# Patient Record
Sex: MALE | Race: WHITE | NOT HISPANIC OR LATINO | URBAN - METROPOLITAN AREA
[De-identification: names, ages, dates, MRNs, and addresses within clinical notes are randomized per-mention and may not be internally consistent; named-entity substitution may affect disease eponyms.]

---

## 2024-01-03 ENCOUNTER — OFFICE VISIT (OUTPATIENT)
Dept: UROLOGY | Facility: CLINIC | Age: 19
End: 2024-01-03
Payer: COMMERCIAL

## 2024-01-03 VITALS
HEIGHT: 73 IN | HEART RATE: 77 BPM | OXYGEN SATURATION: 99 % | DIASTOLIC BLOOD PRESSURE: 88 MMHG | SYSTOLIC BLOOD PRESSURE: 138 MMHG

## 2024-01-03 DIAGNOSIS — N50.811 RIGHT TESTICULAR PAIN: Primary | ICD-10-CM

## 2024-01-03 PROCEDURE — 3075F SYST BP GE 130 - 139MM HG: CPT | Mod: CPTII,S$GLB,, | Performed by: NURSE PRACTITIONER

## 2024-01-03 PROCEDURE — 1159F MED LIST DOCD IN RCRD: CPT | Mod: CPTII,S$GLB,, | Performed by: NURSE PRACTITIONER

## 2024-01-03 PROCEDURE — 3079F DIAST BP 80-89 MM HG: CPT | Mod: CPTII,S$GLB,, | Performed by: NURSE PRACTITIONER

## 2024-01-03 PROCEDURE — 1160F RVW MEDS BY RX/DR IN RCRD: CPT | Mod: CPTII,S$GLB,, | Performed by: NURSE PRACTITIONER

## 2024-01-03 PROCEDURE — 99203 OFFICE O/P NEW LOW 30 MIN: CPT | Mod: S$GLB,,, | Performed by: NURSE PRACTITIONER

## 2024-01-03 NOTE — PROGRESS NOTES
Subjective:      Dg Le is a 18 y.o. male who was self-referred for evaluation of his testicular exam.    The patient presents today reporting fullness around his R testicle that he first noticed about 2 weeks ago. He is concerned for varicocele. Reports feeling a bump near the R testicle. Denies trauma to the groin. The discomfort is alleviated by movement. He discomfort is variable. He is unable to identify aggravating factors.     Denies bothersome urinary symptoms. Denies penile discharge and potential STD exposure.     The following portions of the patient's history were reviewed and updated as appropriate: allergies, current medications, past family history, past medical history, past social history, past surgical history and problem list.    Review of Systems  Constitutional: no fever or chills  ENT: no nasal congestion or sore throat  Respiratory: no cough or shortness of breath  Cardiovascular: no chest pain or palpitations  Gastrointestinal: no nausea or vomiting, tolerating diet  Genitourinary: as per HPI  Hematologic/Lymphatic: no easy bruising or lymphadenopathy  Musculoskeletal: no arthralgias or myalgias  Neurological: no seizures or tremors  Behavioral/Psych: no auditory or visual hallucinations     Objective:   Vitals:   Vitals:    01/03/24 0824   BP: 138/88   Pulse: 77     Physical Exam   General: alert and oriented, no acute distress  Head: normocephalic, atraumatic  Neck: supple, normal ROM  Respiratory: Symmetric expansion, non-labored breathing  Cardiovascular: regular rate and rhythm  Abdomen: soft, non tender, non distended  Genitourinary:   Penis: normal, no lesions, patent orthotopic meatus, no plaques  Scrotum: no rashes or skin changes; tight scrotum- difficult to perform full exam   Testes: descended bilaterally, no masses, nontender, normal epididymides bilaterally, no hydroceles  Skin: normal coloration and turgor, no rashes, no suspicious skin lesions noted  Neuro: alert  "and oriented x3, no gross deficits  Psych: normal judgment and insight, normal mood/affect, and non-anxious    Lab Review   Urinalysis demonstrates negative for all components  No results found for: "WBC", "HGB", "HCT", "MCV", "PLT"  No results found for: "CREATININE", "BUN"  No results found for: "PSA"  Imaging   None    Assessment:     1. Right testicular pain      Plan:   Dg was seen today for possible varicole.  and testicle pain.    Diagnoses and all orders for this visit:    Right testicular pain  -     US Scrotum And Testicles; Future    Plan:  --Discussed conservative measures for relieving testicular pain - scrotal support, ibuprofen, scrotal elevation, ice packs  --Scrotal US for further eval  --Will notify with results       "

## 2024-01-05 ENCOUNTER — HOSPITAL ENCOUNTER (OUTPATIENT)
Dept: RADIOLOGY | Facility: OTHER | Age: 19
Discharge: HOME | End: 2024-01-05
Attending: NURSE PRACTITIONER
Payer: COMMERCIAL

## 2024-01-05 DIAGNOSIS — N50.811 RIGHT TESTICULAR PAIN: ICD-10-CM

## 2024-01-05 PROCEDURE — 76870 US EXAM SCROTUM: CPT | Mod: 26,,, | Performed by: RADIOLOGY

## 2024-01-05 PROCEDURE — 76870 US EXAM SCROTUM: CPT | Mod: TC

## 2024-05-30 ENCOUNTER — LAB VISIT (OUTPATIENT)
Dept: LAB | Facility: HOSPITAL | Age: 19
End: 2024-05-30
Attending: INTERNAL MEDICINE
Payer: COMMERCIAL

## 2024-05-30 ENCOUNTER — OFFICE VISIT (OUTPATIENT)
Dept: PRIMARY CARE CLINIC | Facility: CLINIC | Age: 19
End: 2024-05-30
Payer: COMMERCIAL

## 2024-05-30 VITALS
DIASTOLIC BLOOD PRESSURE: 78 MMHG | HEIGHT: 73 IN | HEART RATE: 100 BPM | OXYGEN SATURATION: 99 % | SYSTOLIC BLOOD PRESSURE: 142 MMHG | BODY MASS INDEX: 24.25 KG/M2 | WEIGHT: 183 LBS

## 2024-05-30 DIAGNOSIS — N50.819 PAIN IN TESTICLE, UNSPECIFIED LATERALITY: Primary | ICD-10-CM

## 2024-05-30 DIAGNOSIS — M54.9 DORSALGIA, UNSPECIFIED: ICD-10-CM

## 2024-05-30 DIAGNOSIS — G89.29 CHRONIC LEFT SI JOINT PAIN: ICD-10-CM

## 2024-05-30 DIAGNOSIS — R10.9 ABDOMINAL PAIN, UNSPECIFIED ABDOMINAL LOCATION: ICD-10-CM

## 2024-05-30 DIAGNOSIS — M53.3 CHRONIC LEFT SI JOINT PAIN: ICD-10-CM

## 2024-05-30 DIAGNOSIS — N50.819 PAIN IN TESTICLE, UNSPECIFIED LATERALITY: ICD-10-CM

## 2024-05-30 LAB
BILIRUB UR QL STRIP: NEGATIVE
CLARITY UR REFRACT.AUTO: CLEAR
COLOR UR AUTO: YELLOW
GLUCOSE UR QL STRIP: NEGATIVE
HGB UR QL STRIP: NEGATIVE
KETONES UR QL STRIP: NEGATIVE
LEUKOCYTE ESTERASE UR QL STRIP: NEGATIVE
MICROSCOPIC COMMENT: NORMAL
NITRITE UR QL STRIP: NEGATIVE
PH UR STRIP: 6 [PH] (ref 5–8)
PROT UR QL STRIP: NEGATIVE
SP GR UR STRIP: 1.01 (ref 1–1.03)
URN SPEC COLLECT METH UR: NORMAL

## 2024-05-30 PROCEDURE — 1159F MED LIST DOCD IN RCRD: CPT | Mod: CPTII,S$GLB,, | Performed by: INTERNAL MEDICINE

## 2024-05-30 PROCEDURE — 81001 URINALYSIS AUTO W/SCOPE: CPT | Performed by: INTERNAL MEDICINE

## 2024-05-30 PROCEDURE — 3077F SYST BP >= 140 MM HG: CPT | Mod: CPTII,S$GLB,, | Performed by: INTERNAL MEDICINE

## 2024-05-30 PROCEDURE — 87086 URINE CULTURE/COLONY COUNT: CPT | Performed by: INTERNAL MEDICINE

## 2024-05-30 PROCEDURE — 99204 OFFICE O/P NEW MOD 45 MIN: CPT | Mod: S$GLB,,, | Performed by: INTERNAL MEDICINE

## 2024-05-30 PROCEDURE — 99999 PR PBB SHADOW E&M-EST. PATIENT-LVL IV: CPT | Mod: PBBFAC,,, | Performed by: INTERNAL MEDICINE

## 2024-05-30 PROCEDURE — 3078F DIAST BP <80 MM HG: CPT | Mod: CPTII,S$GLB,, | Performed by: INTERNAL MEDICINE

## 2024-05-30 PROCEDURE — 3008F BODY MASS INDEX DOCD: CPT | Mod: CPTII,S$GLB,, | Performed by: INTERNAL MEDICINE

## 2024-05-30 RX ORDER — MELOXICAM 7.5 MG/1
7.5 TABLET ORAL DAILY
Qty: 7 TABLET | Refills: 0 | Status: SHIPPED | OUTPATIENT
Start: 2024-05-30

## 2024-05-30 RX ORDER — CYCLOBENZAPRINE HCL 5 MG
5 TABLET ORAL NIGHTLY
Qty: 10 TABLET | Refills: 0 | Status: SHIPPED | OUTPATIENT
Start: 2024-05-30 | End: 2024-06-09

## 2024-05-30 NOTE — PROGRESS NOTES
"[facility] - Urology   Clinic Note    SUBJECTIVE:     No chief complaint on file.      Referral from: Pretty Dawson MD.    History of Present Illness:  Dg Le is a 19 y.o. male who presents to clinic for right orchalgia.    Patient presents with complaints of right testicular pain for several months.  Describes pain as dull and achy but occasionally sharp.  The pain waxes and wanes in intensity and is intermittent.  Denies any issues with erections.  Denies any high-risk sexual activity.  Denies any dysuria or significantly bothersome urinary complaints.    Patient endorses no additional complaints at this time.    No past medical history on file.    No past surgical history on file.    Family History   Problem Relation Name Age of Onset    Drug abuse Mother Sarahi     Alcohol abuse Father Ang     Drug abuse Father Ang        Social History     Tobacco Use    Smoking status: Former     Types: Vaping with nicotine    Smokeless tobacco: Never   Substance Use Topics    Alcohol use: Not Currently    Drug use: Yes     Frequency: 7.0 times per week     Types: Marijuana       Current Outpatient Medications on File Prior to Visit   Medication Sig Dispense Refill    cyclobenzaprine (FLEXERIL) 5 MG tablet Take 1 tablet (5 mg total) by mouth nightly. for 10 days 10 tablet 0    meloxicam (MOBIC) 7.5 MG tablet Take 1 tablet (7.5 mg total) by mouth once daily. 7 tablet 0     No current facility-administered medications on file prior to visit.       Review of patient's allergies indicates:  No Known Allergies    Review of Systems:  A review of 10+ systems was conducted with pertinent positive and negative findings documented in HPI with all other systems reviewed and negative.    OBJECTIVE:     Estimated body mass index is 24.14 kg/m² as calculated from the following:    Height as of 5/30/24: 6' 1" (1.854 m).    Weight as of 5/30/24: 83 kg (182 lb 15.7 oz).    Vital Signs (Most Recent)  There were no vitals filed for " "this visit.    Physical Exam:  GENERAL: patient sitting comfortably  HEENT: normocephalic  NECK: supple, no JVD  PULM: normal chest rise, no increased WOB  HEART: non-diaphoretic  ABDO: soft, nondistended, nontender  BACK: no CVA tenderness bilaterally  SKIN: warm, dry, well perfused  EXT: no bruising or edema  NEURO: grossly normal with no focal deficits  PSYCH: appropriate mood and affect    Genitourinary Exam:   phallus with orthotopic meatus without lesions.  Bilaterally descended testes in normal position and lie without appreciable masses.    No right testicular tenderness to palpation.  Scrotum without erythema, lesions, or masses.  No appreciable inguinal hernias.    No results found for: "BUN", "CREATININE", "GFRNONAA", "GFRAA", "WBC", "HGB", "HCT", "PLT", "AST", "ALT", "ALKPHOS", "ALBUMIN", "HGBA1C", "PT", "PTT", "INR"     Imaging:  I have personally reviewed all relevant imaging studies.    No results found for this or any previous visit (from the past 2160 hour(s)).  No results found for this or any previous visit (from the past 2160 hour(s)).  US Scrotum And Testicles  Narrative: EXAMINATION:  US SCROTUM AND TESTICLES    CLINICAL HISTORY:  Right testicular pain    TECHNIQUE:  Sonography of the scrotum and testes.    COMPARISON:  None.    FINDINGS:  Right Testicle:    *Size: 3.8 x 2.6 x 1.8 cm  *Appearance: Normal.  *Flow: Normal arterial and venous flow  *Epididymis: Normal.  *Hydrocele: None.  *Varicocele: None.  .    Left Testicle:    *Size: 3.8 x 1.6 x 2.4 cm  *Appearance: Normal.  *Flow: Normal arterial and venous flow  *Epididymis: Normal.  *Hydrocele: None.  *Varicocele: None.  .    Other findings: None.  Impression: No significant abnormality.    Electronically signed by: Ankit Yap Jr  Date:    01/05/2024  Time:    10:13       PSA:  No results found for: "PSA", "PSADIAG", "PSATOTAL", "PSAFREE"    Testosterone:  No results found for: "TOTALTESTOST", "TESTOSTERONE"     ASSESSMENT     1. " Right testicular pain        PLAN:     Scrotal ultrasound was reviewed and unremarkable.  Can do ibuprofen 800 mg 4 orchalgia.   Also recommended pelvic floor physical therapy.  Referral placed.    Bladder wall thickening is an unremarkable finding, no intervention required  Follow up with my ALBA in 6 weeks    Shayan Polanco MD  Urology  [facility]    Disclaimer: This note has been generated using voice-recognition software. There may be typographical errors that have been missed during proof-reading.

## 2024-05-30 NOTE — Clinical Note
Young kid with 6 mos of low back pain/SI joint pain and r testiclar pain with decreased sensation in r testicle, impaired cremaster reflex, nothing else. Doing cts and u/s and sending to you

## 2024-05-30 NOTE — PROGRESS NOTES
[facility] Clinic Note    Chief Complaint      Chief Complaint   Patient presents with    Establish Care    Testicle Pain     History of Present Illness      Dg Le is a 19 y.o. male who presents today for chief complaint testicular pain .     HPI  There are no problems to display for this patient.  He his having low back pain on the right side - SI area, x the  last 6 months, has worsened recently 2-3 months, pain is sharp, worse when he bends over, he thinks he maybe lifted weight wrong . He has not taken anything otc   He started having r testicualr pain for the same interval, he laos has decerased sensation in the right testicle, no swelling, no saddle anesthesia, no change in bowel or bladder habits, no dysuria     Health Maintenance   Topic Date Due    Hepatitis C Screening  Never done    Lipid Panel  Never done    TETANUS VACCINE  12/06/2027    HPV Vaccines  Completed       History reviewed. No pertinent past medical history.    History reviewed. No pertinent surgical history.    family history includes Alcohol abuse in his father; Drug abuse in his father and mother.     Social History     Tobacco Use    Smoking status: Former     Types: Vaping with nicotine    Smokeless tobacco: Never   Substance Use Topics    Alcohol use: Not Currently    Drug use: Yes     Frequency: 7.0 times per week     Types: Marijuana       Review of Systems   Constitutional:  Negative for chills, fever, malaise/fatigue and weight loss.   Respiratory:  Negative for cough, sputum production, shortness of breath and wheezing.    Cardiovascular:  Negative for chest pain, palpitations, orthopnea and leg swelling.   Gastrointestinal:  Negative for abdominal pain, constipation, diarrhea, nausea and vomiting.   Genitourinary:  Negative for dysuria, flank pain, frequency, hematuria and urgency.        No saddle anesthsia    Musculoskeletal:  Positive for back pain.        Outpatient Encounter Medications as of  "5/30/2024   Medication Sig Dispense Refill    cyclobenzaprine (FLEXERIL) 5 MG tablet Take 1 tablet (5 mg total) by mouth nightly. for 10 days 10 tablet 0    meloxicam (MOBIC) 7.5 MG tablet Take 1 tablet (7.5 mg total) by mouth once daily. 7 tablet 0     No facility-administered encounter medications on file as of 5/30/2024.       Review of patient's allergies indicates:  No Known Allergies      Physical Exam      Vital Signs  Pulse: 100  SpO2: 99 %  BP: (!) 142/78  BP Location: Left arm  Patient Position: Sitting  Height and Weight  Height: 6' 1" (185.4 cm)  Weight: 83 kg (182 lb 15.7 oz)  BSA (Calculated - sq m): 2.07 sq meters  BMI (Calculated): 24.1  Weight in (lb) to have BMI = 25: 189.1]    Physical Exam  Vitals reviewed.   Constitutional:       Appearance: He is well-developed. He is not diaphoretic.   HENT:      Head: Normocephalic and atraumatic.      Right Ear: External ear normal.      Left Ear: External ear normal.   Eyes:      General: No scleral icterus.        Right eye: No discharge.         Left eye: No discharge.      Conjunctiva/sclera: Conjunctivae normal.   Cardiovascular:      Rate and Rhythm: Normal rate and regular rhythm.      Heart sounds: Normal heart sounds.   Pulmonary:      Effort: Pulmonary effort is normal. No respiratory distress.      Breath sounds: Normal breath sounds.   Musculoskeletal:         General: Normal range of motion.      Cervical back: Normal range of motion.      Comments: TTP over l spine and SI joint, nl rom    Neurological:      Mental Status: He is alert and oriented to person, place, and time.   Psychiatric:         Behavior: Behavior normal.         Thought Content: Thought content normal.         Judgment: Judgment normal.            Assessment/Plan     Dg Le is a 19 y.o.male with:    1. Pain in testicle, unspecified laterality  -     US Scrotum And Testicles; Future; Expected date: 05/30/2024  -     Urinalysis; Future; Expected date: 05/30/2024  -  "    Urinalysis Microscopic; Future; Expected date: 05/30/2024  -     CULTURE, URINE; Future; Expected date: 05/30/2024  -     Ambulatory referral/consult to Urology; Future; Expected date: 06/06/2024    2. Abdominal pain, unspecified abdominal location  -     CT Abdomen Pelvis  Without Contrast; Future; Expected date: 05/30/2024    3. Dorsalgia, unspecified  -     CT Lumbar Spine Without Contrast; Future; Expected date: 05/30/2024    4. Chronic left SI joint pain  -     meloxicam (MOBIC) 7.5 MG tablet; Take 1 tablet (7.5 mg total) by mouth once daily.  Dispense: 7 tablet; Refill: 0  -     cyclobenzaprine (FLEXERIL) 5 MG tablet; Take 1 tablet (5 mg total) by mouth nightly. for 10 days  Dispense: 10 tablet; Refill: 0        Use of the [employer] Patient Portal discussed and encouraged during today's visit  -Continue current medications and maintain follow up with specialists.  Return to clinic in prn.  No future appointments.    Pretty Dawson MD  5/30/2024 11:33 AM    Primary Care Internal Medicine

## 2024-05-31 ENCOUNTER — HOSPITAL ENCOUNTER (OUTPATIENT)
Dept: RADIOLOGY | Facility: HOSPITAL | Age: 19
Discharge: HOME | End: 2024-05-31
Attending: INTERNAL MEDICINE
Payer: COMMERCIAL

## 2024-05-31 DIAGNOSIS — M54.9 DORSALGIA, UNSPECIFIED: ICD-10-CM

## 2024-05-31 DIAGNOSIS — R10.9 ABDOMINAL PAIN, UNSPECIFIED ABDOMINAL LOCATION: ICD-10-CM

## 2024-05-31 LAB — BACTERIA UR CULT: NO GROWTH

## 2024-05-31 PROCEDURE — 74176 CT ABD & PELVIS W/O CONTRAST: CPT | Mod: 26,,, | Performed by: RADIOLOGY

## 2024-05-31 PROCEDURE — 72131 CT LUMBAR SPINE W/O DYE: CPT | Mod: 26,,, | Performed by: RADIOLOGY

## 2024-05-31 PROCEDURE — 74176 CT ABD & PELVIS W/O CONTRAST: CPT | Mod: TC

## 2024-05-31 PROCEDURE — 72131 CT LUMBAR SPINE W/O DYE: CPT | Mod: TC

## 2024-06-03 ENCOUNTER — OFFICE VISIT (OUTPATIENT)
Dept: UROLOGY | Facility: CLINIC | Age: 19
End: 2024-06-03
Payer: COMMERCIAL

## 2024-06-03 VITALS
BODY MASS INDEX: 24.26 KG/M2 | HEART RATE: 114 BPM | WEIGHT: 183.06 LBS | HEIGHT: 73 IN | SYSTOLIC BLOOD PRESSURE: 133 MMHG | DIASTOLIC BLOOD PRESSURE: 94 MMHG

## 2024-06-03 DIAGNOSIS — N50.811 RIGHT TESTICULAR PAIN: Primary | ICD-10-CM

## 2024-06-03 DIAGNOSIS — N32.89 BLADDER WALL THICKENING: ICD-10-CM

## 2024-06-03 DIAGNOSIS — N50.819 PAIN IN TESTICLE, UNSPECIFIED LATERALITY: ICD-10-CM

## 2024-06-03 PROCEDURE — 3080F DIAST BP >= 90 MM HG: CPT | Mod: CPTII,S$GLB,, | Performed by: UROLOGY

## 2024-06-03 PROCEDURE — 1159F MED LIST DOCD IN RCRD: CPT | Mod: CPTII,S$GLB,, | Performed by: UROLOGY

## 2024-06-03 PROCEDURE — 99213 OFFICE O/P EST LOW 20 MIN: CPT | Mod: S$GLB,,, | Performed by: UROLOGY

## 2024-06-03 PROCEDURE — 99999 PR PBB SHADOW E&M-EST. PATIENT-LVL III: CPT | Mod: PBBFAC,,, | Performed by: UROLOGY

## 2024-06-03 PROCEDURE — 3075F SYST BP GE 130 - 139MM HG: CPT | Mod: CPTII,S$GLB,, | Performed by: UROLOGY

## 2024-06-03 PROCEDURE — 3008F BODY MASS INDEX DOCD: CPT | Mod: CPTII,S$GLB,, | Performed by: UROLOGY

## 2024-06-03 RX ORDER — IBUPROFEN 800 MG/1
800 TABLET ORAL 3 TIMES DAILY PRN
Qty: 90 TABLET | Refills: 0 | Status: SHIPPED | OUTPATIENT
Start: 2024-06-03 | End: 2024-07-03

## 2024-06-05 ENCOUNTER — PATIENT MESSAGE (OUTPATIENT)
Dept: PRIMARY CARE CLINIC | Facility: CLINIC | Age: 19
End: 2024-06-05
Payer: COMMERCIAL

## 2024-06-05 DIAGNOSIS — M51.26 HERNIATED INTERVERTEBRAL DISC OF LUMBAR SPINE: Primary | ICD-10-CM

## 2024-06-07 ENCOUNTER — HOSPITAL ENCOUNTER (OUTPATIENT)
Dept: RADIOLOGY | Facility: HOSPITAL | Age: 19
Discharge: HOME | End: 2024-06-07
Attending: INTERNAL MEDICINE
Payer: COMMERCIAL

## 2024-06-07 DIAGNOSIS — N50.819 PAIN IN TESTICLE, UNSPECIFIED LATERALITY: ICD-10-CM

## 2024-06-07 PROCEDURE — 76870 US EXAM SCROTUM: CPT | Mod: TC

## 2024-06-07 PROCEDURE — 76870 US EXAM SCROTUM: CPT | Mod: 26,,, | Performed by: RADIOLOGY

## 2024-06-11 ENCOUNTER — TELEPHONE (OUTPATIENT)
Dept: ORTHOPEDICS | Facility: CLINIC | Age: 19
End: 2024-06-11
Payer: COMMERCIAL

## 2024-06-11 DIAGNOSIS — M51.36 DDD (DEGENERATIVE DISC DISEASE), LUMBAR: Primary | ICD-10-CM

## 2024-06-11 NOTE — TELEPHONE ENCOUNTER
Spoke to patient regarding x-ray. Patient is aware of x-ray scheduled for 7:00 am on 06/14/2024 at  the [facility]. Patient stated thank you. Thanks.

## 2024-06-14 ENCOUNTER — HOSPITAL ENCOUNTER (OUTPATIENT)
Dept: RADIOLOGY | Facility: HOSPITAL | Age: 19
Discharge: HOME | End: 2024-06-14
Attending: REGISTERED NURSE
Payer: COMMERCIAL

## 2024-06-14 DIAGNOSIS — M51.36 DDD (DEGENERATIVE DISC DISEASE), LUMBAR: ICD-10-CM

## 2024-06-14 PROCEDURE — 72110 X-RAY EXAM L-2 SPINE 4/>VWS: CPT | Mod: 26,,, | Performed by: RADIOLOGY

## 2024-06-14 PROCEDURE — 72110 X-RAY EXAM L-2 SPINE 4/>VWS: CPT | Mod: TC

## 2024-06-27 ENCOUNTER — OFFICE VISIT (OUTPATIENT)
Dept: PRIMARY CARE CLINIC | Facility: CLINIC | Age: 19
End: 2024-06-27
Payer: COMMERCIAL

## 2024-06-27 ENCOUNTER — LAB VISIT (OUTPATIENT)
Dept: LAB | Facility: HOSPITAL | Age: 19
End: 2024-06-27
Attending: INTERNAL MEDICINE
Payer: COMMERCIAL

## 2024-06-27 VITALS
DIASTOLIC BLOOD PRESSURE: 80 MMHG | SYSTOLIC BLOOD PRESSURE: 124 MMHG | HEART RATE: 104 BPM | BODY MASS INDEX: 23.43 KG/M2 | HEIGHT: 73 IN | WEIGHT: 176.81 LBS | OXYGEN SATURATION: 98 %

## 2024-06-27 DIAGNOSIS — N62 GYNECOMASTIA: ICD-10-CM

## 2024-06-27 DIAGNOSIS — H91.8X2 OTHER SPECIFIED HEARING LOSS OF LEFT EAR, UNSPECIFIED HEARING STATUS ON CONTRALATERAL SIDE: Primary | ICD-10-CM

## 2024-06-27 LAB
ALBUMIN SERPL BCP-MCNC: 4.7 G/DL (ref 3.5–5.2)
ALP SERPL-CCNC: 51 U/L (ref 55–135)
ALT SERPL W/O P-5'-P-CCNC: 6 U/L (ref 10–44)
ANION GAP SERPL CALC-SCNC: 10 MMOL/L (ref 8–16)
AST SERPL-CCNC: 15 U/L (ref 10–40)
BILIRUB SERPL-MCNC: 0.8 MG/DL (ref 0.1–1)
BUN SERPL-MCNC: 9 MG/DL (ref 6–20)
CALCIUM SERPL-MCNC: 9.8 MG/DL (ref 8.7–10.5)
CHLORIDE SERPL-SCNC: 105 MMOL/L (ref 95–110)
CO2 SERPL-SCNC: 24 MMOL/L (ref 23–29)
CREAT SERPL-MCNC: 1 MG/DL (ref 0.5–1.4)
EST. GFR  (NO RACE VARIABLE): >60 ML/MIN/1.73 M^2
GLUCOSE SERPL-MCNC: 99 MG/DL (ref 70–110)
POTASSIUM SERPL-SCNC: 4.8 MMOL/L (ref 3.5–5.1)
PROT SERPL-MCNC: 7.4 G/DL (ref 6–8.4)
SODIUM SERPL-SCNC: 139 MMOL/L (ref 136–145)
T4 FREE SERPL-MCNC: 1.05 NG/DL (ref 0.71–1.51)
TESTOST SERPL-MCNC: 295 NG/DL (ref 304–1227)
TSH SERPL DL<=0.005 MIU/L-ACNC: 1.05 UIU/ML (ref 0.4–4)

## 2024-06-27 PROCEDURE — 84443 ASSAY THYROID STIM HORMONE: CPT | Performed by: NURSE PRACTITIONER

## 2024-06-27 PROCEDURE — 84439 ASSAY OF FREE THYROXINE: CPT | Performed by: NURSE PRACTITIONER

## 2024-06-27 PROCEDURE — 99999 PR PBB SHADOW E&M-EST. PATIENT-LVL IV: CPT | Mod: PBBFAC,,, | Performed by: NURSE PRACTITIONER

## 2024-06-27 PROCEDURE — 84403 ASSAY OF TOTAL TESTOSTERONE: CPT | Performed by: NURSE PRACTITIONER

## 2024-06-27 PROCEDURE — 1159F MED LIST DOCD IN RCRD: CPT | Mod: CPTII,S$GLB,, | Performed by: NURSE PRACTITIONER

## 2024-06-27 PROCEDURE — 3074F SYST BP LT 130 MM HG: CPT | Mod: CPTII,S$GLB,, | Performed by: NURSE PRACTITIONER

## 2024-06-27 PROCEDURE — 99214 OFFICE O/P EST MOD 30 MIN: CPT | Mod: S$GLB,,, | Performed by: NURSE PRACTITIONER

## 2024-06-27 PROCEDURE — 36415 COLL VENOUS BLD VENIPUNCTURE: CPT | Performed by: NURSE PRACTITIONER

## 2024-06-27 PROCEDURE — 80053 COMPREHEN METABOLIC PANEL: CPT | Performed by: NURSE PRACTITIONER

## 2024-06-27 PROCEDURE — 3079F DIAST BP 80-89 MM HG: CPT | Mod: CPTII,S$GLB,, | Performed by: NURSE PRACTITIONER

## 2024-06-27 PROCEDURE — 3008F BODY MASS INDEX DOCD: CPT | Mod: CPTII,S$GLB,, | Performed by: NURSE PRACTITIONER

## 2024-06-27 NOTE — PROGRESS NOTES
[facility]    Chief Complaint      Chief Complaint   Patient presents with    Otalgia     Pressure in left ear       History of Present Illness      Dg Le is a 19 y.o. male with no known chronic conditions who presents today for: follow up for concerns of gynecomastia. States has noticed since 8th grade. Thinks he resembles someone with grade 1 gynecomastia. Reviewed medication list, no aggravating medication.  Denies sensitivity. Denies family hx of breast cancer.     Past Medical History:  History reviewed. No pertinent past medical history.    Past Surgical History:   has no past surgical history on file.    Family History:  family history includes Alcohol abuse in his father; Drug abuse in his father and mother.     Social History:  Social History     Tobacco Use    Smoking status: Former     Types: Vaping with nicotine    Smokeless tobacco: Never   Substance Use Topics    Alcohol use: Not Currently    Drug use: Yes     Frequency: 7.0 times per week     Types: Marijuana       Review of Systems   Constitutional:  Negative for chills and fever.   Respiratory:  Negative for cough and shortness of breath.    Cardiovascular:  Negative for chest pain and palpitations.   Gastrointestinal:  Negative for constipation, diarrhea, nausea and vomiting.   Genitourinary:  Negative for dysuria and hematuria.   Musculoskeletal:  Negative for falls.   Neurological:  Negative for headaches.        Medications:  Outpatient Encounter Medications as of 6/27/2024   Medication Sig Dispense Refill    ibuprofen (ADVIL,MOTRIN) 800 MG tablet Take 1 tablet (800 mg total) by mouth 3 (three) times daily as needed for Pain. 90 tablet 0    meloxicam (MOBIC) 7.5 MG tablet Take 1 tablet (7.5 mg total) by mouth once daily. 7 tablet 0     No facility-administered encounter medications on file as of 6/27/2024.       Allergies:  Review of patient's allergies indicates:  No Known Allergies    Health  "Maintenance:    There is no immunization history on file for this patient.   Health Maintenance   Topic Date Due    Hepatitis C Screening  Never done    Lipid Panel  Never done    TETANUS VACCINE  12/06/2027    HPV Vaccines  Completed        Physical Exam      Vital Signs  Pulse: 104  SpO2: 98 %  BP: 124/80  BP Location: Left arm  Patient Position: Sitting  Pain Score: 0-No pain  Height and Weight  Height: 6' 1" (185.4 cm)  Weight: 80.2 kg (176 lb 12.9 oz)  BSA (Calculated - sq m): 2.03 sq meters  BMI (Calculated): 23.3  Weight in (lb) to have BMI = 25: 189.1]    Physical Exam  Constitutional:       Appearance: He is well-developed.   HENT:      Head: Normocephalic and atraumatic.   Neck:      Thyroid: No thyromegaly.   Cardiovascular:      Rate and Rhythm: Normal rate and regular rhythm.      Heart sounds: No murmur heard.  Pulmonary:      Effort: Pulmonary effort is normal. No respiratory distress.      Breath sounds: Normal breath sounds.   Chest:   Breasts:     Right: No nipple discharge or tenderness.      Left: No nipple discharge or tenderness.      Comments: Palpable tissue <1cm underlying nipple bilateral  Abdominal:      General: There is no distension.      Palpations: Abdomen is soft.      Tenderness: There is no abdominal tenderness.   Skin:     General: Skin is warm and dry.   Neurological:      Mental Status: He is alert and oriented to person, place, and time.   Psychiatric:         Behavior: Behavior normal.          Laboratory:  CBC:      CMP:        Invalid input(s): "CREATININ"  URINALYSIS:  Recent Labs   Lab 05/30/24  1219   Color, UA Yellow   Specific Gravity, UA 1.015   pH, UA 6.0   Protein, UA Negative   Nitrite, UA Negative   Leukocytes, UA Negative      LIPIDS:      TSH:      A1C:        Assessment/Plan     Dg Le is a 19 y.o.male with:    1. Other specified hearing loss of left ear, unspecified hearing status on contralateral side  - Ambulatory referral/consult to ENT; " Future    2. Gynecomastia  - TESTOSTERONE; Future  - Ambulatory referral/consult to Plastic Surgery; Future  - TSH; Future  - T4, FREE; Future  - COMPREHENSIVE METABOLIC PANEL; Future  - Mammo Digital Screening Bilat w/ Ángel; Future       Chronic conditions status updated as per HPI.  Other than changes above, cont current medications and maintain follow up with specialists.  No follow-ups on file.    Future Appointments   Date Time Provider Department Center   7/15/2024  8:45 AM Maddy Ahn, PT [facility] JEANETTE [facility]   7/17/2024  9:30 AM Babatunde Norris MD [facility] ENT [facility]   8/1/2024  9:00 AM Pao Gallo, TR[facility] UROLOGY [facility] Adrienne Cotaya, FNP [facility]

## 2024-06-28 DIAGNOSIS — R79.89 LOW TESTOSTERONE IN MALE: Primary | ICD-10-CM

## 2024-07-01 RX ORDER — IBUPROFEN 800 MG/1
800 TABLET ORAL
Qty: 90 TABLET | Refills: 0 | Status: SHIPPED | OUTPATIENT
Start: 2024-07-01

## 2024-07-03 ENCOUNTER — PATIENT MESSAGE (OUTPATIENT)
Dept: PRIMARY CARE CLINIC | Facility: CLINIC | Age: 19
End: 2024-07-03
Payer: COMMERCIAL

## 2024-07-05 ENCOUNTER — LAB VISIT (OUTPATIENT)
Dept: LAB | Facility: HOSPITAL | Age: 19
End: 2024-07-05
Attending: INTERNAL MEDICINE
Payer: COMMERCIAL

## 2024-07-05 ENCOUNTER — HOSPITAL ENCOUNTER (OUTPATIENT)
Dept: RADIOLOGY | Facility: HOSPITAL | Age: 19
Discharge: HOME | End: 2024-07-05
Attending: NURSE PRACTITIONER
Payer: COMMERCIAL

## 2024-07-05 DIAGNOSIS — R79.89 LOW TESTOSTERONE IN MALE: ICD-10-CM

## 2024-07-05 DIAGNOSIS — N62 GYNECOMASTIA: ICD-10-CM

## 2024-07-05 LAB
FSH SERPL-ACNC: 4.65 MIU/ML (ref 0.95–11.95)
LH SERPL-ACNC: 2.5 MIU/ML (ref 0.6–12.1)
TESTOST SERPL-MCNC: 303 NG/DL (ref 304–1227)

## 2024-07-05 PROCEDURE — 84403 ASSAY OF TOTAL TESTOSTERONE: CPT | Performed by: NURSE PRACTITIONER

## 2024-07-05 PROCEDURE — 83001 ASSAY OF GONADOTROPIN (FSH): CPT | Performed by: NURSE PRACTITIONER

## 2024-07-05 PROCEDURE — 77062 BREAST TOMOSYNTHESIS BI: CPT | Mod: 26,,, | Performed by: RADIOLOGY

## 2024-07-05 PROCEDURE — 77066 DX MAMMO INCL CAD BI: CPT | Mod: 26,,, | Performed by: RADIOLOGY

## 2024-07-05 PROCEDURE — 83002 ASSAY OF GONADOTROPIN (LH): CPT | Performed by: NURSE PRACTITIONER

## 2024-07-05 PROCEDURE — 77062 BREAST TOMOSYNTHESIS BI: CPT | Mod: TC

## 2024-07-05 PROCEDURE — 36415 COLL VENOUS BLD VENIPUNCTURE: CPT | Performed by: NURSE PRACTITIONER

## 2024-07-05 PROCEDURE — 77066 DX MAMMO INCL CAD BI: CPT | Mod: TC

## 2024-07-16 ENCOUNTER — TELEPHONE (OUTPATIENT)
Dept: PRIMARY CARE CLINIC | Facility: CLINIC | Age: 19
End: 2024-07-16
Payer: COMMERCIAL

## 2024-07-16 DIAGNOSIS — M79.671 FOOT PAIN, RIGHT: Primary | ICD-10-CM

## 2024-07-16 NOTE — TELEPHONE ENCOUNTER
About a month ago I jumped up maybe four inches off the ground and landed on my pinky toe/side of my right foot with all my body weight. I was couldnt walk on it for a week and a half.    Comments:  I believe I may have a stress fracture in my right foot and would like to make sure. The top/side of my foot hurts when walking.

## 2024-07-17 ENCOUNTER — OFFICE VISIT (OUTPATIENT)
Dept: OTOLARYNGOLOGY | Facility: CLINIC | Age: 19
End: 2024-07-17
Payer: COMMERCIAL

## 2024-07-17 ENCOUNTER — CLINICAL SUPPORT (OUTPATIENT)
Dept: AUDIOLOGY | Facility: CLINIC | Age: 19
End: 2024-07-17
Payer: COMMERCIAL

## 2024-07-17 ENCOUNTER — OFFICE VISIT (OUTPATIENT)
Dept: PLASTIC SURGERY | Facility: CLINIC | Age: 19
End: 2024-07-17
Payer: COMMERCIAL

## 2024-07-17 ENCOUNTER — HOSPITAL ENCOUNTER (OUTPATIENT)
Dept: RADIOLOGY | Facility: HOSPITAL | Age: 19
Discharge: HOME | End: 2024-07-17
Attending: INTERNAL MEDICINE
Payer: COMMERCIAL

## 2024-07-17 VITALS
SYSTOLIC BLOOD PRESSURE: 124 MMHG | HEIGHT: 73 IN | WEIGHT: 170 LBS | HEART RATE: 104 BPM | BODY MASS INDEX: 22.53 KG/M2 | DIASTOLIC BLOOD PRESSURE: 80 MMHG

## 2024-07-17 DIAGNOSIS — N62 GYNECOMASTIA: ICD-10-CM

## 2024-07-17 DIAGNOSIS — H93.293 ABNORMAL AUDITORY PERCEPTION OF BOTH EARS: ICD-10-CM

## 2024-07-17 DIAGNOSIS — H69.93 DYSFUNCTION OF BOTH EUSTACHIAN TUBES: ICD-10-CM

## 2024-07-17 DIAGNOSIS — J30.9 ALLERGIC RHINITIS, UNSPECIFIED SEASONALITY, UNSPECIFIED TRIGGER: ICD-10-CM

## 2024-07-17 DIAGNOSIS — H69.93 EUSTACHIAN TUBE DYSFUNCTION, BILATERAL: Primary | ICD-10-CM

## 2024-07-17 DIAGNOSIS — H91.8X2 OTHER SPECIFIED HEARING LOSS OF LEFT EAR, UNSPECIFIED HEARING STATUS ON CONTRALATERAL SIDE: ICD-10-CM

## 2024-07-17 DIAGNOSIS — M79.671 FOOT PAIN, RIGHT: ICD-10-CM

## 2024-07-17 PROCEDURE — 99999 PR PBB SHADOW E&M-EST. PATIENT-LVL III: CPT | Mod: PBBFAC,,, | Performed by: SURGERY

## 2024-07-17 PROCEDURE — 3074F SYST BP LT 130 MM HG: CPT | Mod: CPTII,S$GLB,, | Performed by: SURGERY

## 2024-07-17 PROCEDURE — 99203 OFFICE O/P NEW LOW 30 MIN: CPT | Mod: S$GLB,,, | Performed by: SURGERY

## 2024-07-17 PROCEDURE — 92567 TYMPANOMETRY: CPT | Mod: S$GLB,,,

## 2024-07-17 PROCEDURE — 92557 COMPREHENSIVE HEARING TEST: CPT | Mod: S$GLB,,,

## 2024-07-17 PROCEDURE — 1159F MED LIST DOCD IN RCRD: CPT | Mod: CPTII,S$GLB,, | Performed by: OTOLARYNGOLOGY

## 2024-07-17 PROCEDURE — 73630 X-RAY EXAM OF FOOT: CPT | Mod: 26,RT,, | Performed by: RADIOLOGY

## 2024-07-17 PROCEDURE — 99204 OFFICE O/P NEW MOD 45 MIN: CPT | Mod: S$GLB,,, | Performed by: OTOLARYNGOLOGY

## 2024-07-17 PROCEDURE — 3008F BODY MASS INDEX DOCD: CPT | Mod: CPTII,S$GLB,, | Performed by: SURGERY

## 2024-07-17 PROCEDURE — 73630 X-RAY EXAM OF FOOT: CPT | Mod: TC,RT

## 2024-07-17 PROCEDURE — 99999 PR PBB SHADOW E&M-EST. PATIENT-LVL II: CPT | Mod: PBBFAC,,, | Performed by: OTOLARYNGOLOGY

## 2024-07-17 PROCEDURE — 99999 PR PBB SHADOW E&M-EST. PATIENT-LVL I: CPT | Mod: PBBFAC,,,

## 2024-07-17 PROCEDURE — 3079F DIAST BP 80-89 MM HG: CPT | Mod: CPTII,S$GLB,, | Performed by: SURGERY

## 2024-07-17 PROCEDURE — 1159F MED LIST DOCD IN RCRD: CPT | Mod: CPTII,S$GLB,, | Performed by: SURGERY

## 2024-07-17 NOTE — PROGRESS NOTES
.Plastic and Reconstructive Surgery   H&P    Date of Surgery:   07/17/2024    History of Present Illness:  19yoM w/o significant PMI who presents to clinic for evaluation of gynecomastia. States he starting having b/l budding of breasts in 8th grade at age 12. Since then has completed growth spurt 3 years ago. States no overall resolution of gynecomastia and has increased with aging. He denies pain, lumps, family or personal hx of breast cancer .denies smoking but does endorse marijuane    Past Medical History:    has no past medical history on file.    Past Surgical History:    has no past surgical history on file.    Social History:  Social History     Tobacco Use    Smoking status: Former     Types: Vaping with nicotine    Smokeless tobacco: Never   Substance Use Topics    Alcohol use: Not Currently     Social History     Substance and Sexual Activity   Drug Use Yes    Frequency: 7.0 times per week    Types: Marijuana       Family History:  Family History   Problem Relation Name Age of Onset    Drug abuse Mother Sarahi     Alcohol abuse Father Ang     Drug abuse Father Ang        Allergies:  Review of patient's allergies indicates:  No Known Allergies    Home Medications:  Prior to Admission medications    Medication Sig Start Date End Date Taking? Authorizing Provider   ibuprofen (ADVIL,MOTRIN) 800 MG tablet TAKE 1 TABLET BY MOUTH 3 TIMES DAILY AS NEEDED FOR PAIN. 7/1/24   Shayan Polanco MD       Review of Systems:  Constitutional: Denies fever/chills  Eyes: Denies change in vision  ENT: Denies sore throat or rhinorrhea   Respiratory: Denies shortness of breath or cough  Cardiovascular: Denies chest pain or palpitations  Gastrointestinal: Denies abdominal pain, nausea, or vomiting  Genitourinary: Denies dysuria and flank pain  Skin: Denies new rash or skin lesions   Allergic/Immunologic: Denies adverse reactions to current medications  Neurological: Denies headaches or dizziness  Musculoskeletal: Denies  "arthralgias    Physical Exam:  VITAL SIGNS:   Vitals:    07/17/24 1056   BP: 124/80   Pulse: 104   Weight: 77.1 kg (170 lb)   Height: 6' 1" (1.854 m)     TMAX: @TMAX(24)@  General: Alert; No acute distress  Cardiovascular: Regular rate   Respiratory: Normal respiratory effort. Chest rise symmetric.   Abdomen: Soft, nontender, nondistended  Extremity: Moves all extremities equally.  Neurologic: No focal deficit. Speech normal  Breast: bilateral breast budding above pec. Very large areola         Diagnostic Data:  No results found for this or any previous visit (from the past 336 hour(s)).  No results found for this or any previous visit (from the past 336 hour(s)).  Lab Results   Component Value Date    ALBUMIN 4.7 06/27/2024     No results found for: "CRP"  No results found for: "INR", "PROTIME"  No results found for: "PTT"    Microbiology Results (last 7 days)       ** No results found for the last 168 hours. **            Assessment:  19 y.o.male bilateral gynecostomia    Plan:  - reasonable to proceed with b/l mastectomy thru a pérez-areolar incision  - will offer cash price  - discussed areolar reduction possible at the same surgery or as a secondary procedure under local  - risks vs benefits and alt tx options d/w pt, agreeable to proceed  - will send for pictures      Gilberto Lopez MD  Plastic and Reconstructive Surgery   [phone]     "

## 2024-07-17 NOTE — PROGRESS NOTES
Subjective     Patient ID: Dg Le is a 19 y.o. male.    Chief Complaint: Ear Fullness    HPI: Hx of L ear fullness, popping when swallows.    Pre for 1 yr with ? Plane flight.    Hx of OM AS in past.    ?? Fam Hx.    + right nasal congestion.    Past Medical History: Patient has no past medical history on file.    Past Surgical History: Patient has no past surgical history on file.    Social History: Patient reports that he has quit smoking. His smoking use included vaping with nicotine. He has never used smokeless tobacco. He reports that he does not currently use alcohol. He reports current drug use. Frequency: 7.00 times per week. Drug: Marijuana.    Family History: family history includes Alcohol abuse in his father; Drug abuse in his father and mother.    Medications:   Current Outpatient Medications   Medication Sig    ibuprofen (ADVIL,MOTRIN) 800 MG tablet TAKE 1 TABLET BY MOUTH 3 TIMES DAILY AS NEEDED FOR PAIN.     No current facility-administered medications for this visit.       Allergies: Patient has No Known Allergies.    Review of Systems   Constitutional: Negative.  Negative for activity change, appetite change, chills, diaphoresis, fatigue, fever and unexpected weight change.   HENT:  Positive for nasal congestion, ear pain and hearing loss. Negative for ear discharge, facial swelling, nosebleeds, postnasal drip, rhinorrhea, sinus pressure/congestion, sneezing, sore throat, tinnitus, trouble swallowing and voice change.    Eyes: Negative.  Negative for photophobia, pain, discharge, redness and visual disturbance.   Respiratory: Negative.  Negative for cough, chest tightness, shortness of breath and wheezing.    Cardiovascular: Negative.  Negative for chest pain and palpitations.   Gastrointestinal: Negative.  Negative for abdominal pain, constipation, diarrhea and nausea.   Endocrine: Negative.    Genitourinary: Negative.  Negative for dysuria and frequency.   Musculoskeletal: Negative.   Negative for arthralgias, back pain, gait problem, joint swelling, myalgias, neck pain and neck stiffness.   Integumentary:  Negative for color change, pallor and rash. Negative.   Allergic/Immunologic: Negative.    Neurological: Negative.  Negative for dizziness, tremors, seizures, syncope, facial asymmetry, speech difficulty, weakness, light-headedness, numbness and headaches.   Hematological: Negative.  Negative for adenopathy. Does not bruise/bleed easily.   Psychiatric/Behavioral: Negative.  Negative for agitation, confusion, decreased concentration, dysphoric mood and sleep disturbance. The patient is not nervous/anxious and is not hyperactive.           Objective     Physical Exam  Vitals and nursing note reviewed.   Constitutional:       General: He is not in acute distress.     Appearance: Normal appearance. He is well-developed. He is not ill-appearing, toxic-appearing or diaphoretic.   HENT:      Head: Normocephalic and atraumatic. Not macrocephalic and not microcephalic. No raccoon eyes, Ernandez's sign, abrasion, contusion, right periorbital erythema, left periorbital erythema or laceration. Hair is normal.      Right Ear: Ear canal normal. No decreased hearing noted. No laceration, drainage, swelling or tenderness. No middle ear effusion. No foreign body. No mastoid tenderness. No hemotympanum. Tympanic membrane is not injected, scarred, perforated, erythematous, retracted or bulging. Tympanic membrane has normal mobility.      Left Ear: Ear canal normal. Decreased hearing noted. No laceration, drainage, swelling or tenderness.  No middle ear effusion. No foreign body. No mastoid tenderness. No hemotympanum. Tympanic membrane is not injected, scarred, perforated, erythematous, retracted or bulging. Tympanic membrane has normal mobility.      Nose: Mucosal edema present. No nasal deformity, septal deviation, laceration or rhinorrhea.      Right Sinus: No maxillary sinus tenderness.      Left Sinus: No  maxillary sinus tenderness.   Eyes:      General: Lids are normal.      Conjunctiva/sclera: Conjunctivae normal.      Pupils: Pupils are equal, round, and reactive to light.   Neck:      Thyroid: No thyroid mass or thyromegaly.      Vascular: No JVD.      Trachea: No tracheal tenderness or tracheal deviation.   Cardiovascular:      Rate and Rhythm: Normal rate and regular rhythm.   Pulmonary:      Effort: Pulmonary effort is normal. No tachypnea, bradypnea, accessory muscle usage or respiratory distress.      Breath sounds: No stridor.   Abdominal:      Palpations: Abdomen is soft.   Musculoskeletal:         General: Normal range of motion.      Cervical back: Normal range of motion and neck supple. No edema, erythema or rigidity. No muscular tenderness. Normal range of motion.   Lymphadenopathy:      Head:      Right side of head: No submental, submandibular, tonsillar, preauricular or posterior auricular adenopathy.      Left side of head: No submental, submandibular, tonsillar, preauricular or posterior auricular adenopathy.      Cervical: No cervical adenopathy.      Right cervical: No superficial, deep or posterior cervical adenopathy.     Left cervical: No superficial, deep or posterior cervical adenopathy.   Skin:     General: Skin is warm and dry.      Coloration: Skin is not pale.      Findings: No abrasion, bruising, burn, ecchymosis, erythema, laceration, lesion or rash.   Neurological:      Mental Status: He is alert and oriented to person, place, and time.      Cranial Nerves: No cranial nerve deficit.      Sensory: No sensory deficit.      Motor: No tremor, atrophy, abnormal muscle tone or seizure activity.   Psychiatric:         Speech: He is communicative. Speech is not rapid and pressured or slurred.         Behavior: Behavior normal. Behavior is cooperative.         Thought Content: Thought content normal.         Judgment: Judgment normal.            Assessment and Plan     1. Other specified  hearing loss of left ear, unspecified hearing status on contralateral side  -     Ambulatory referral/consult to ENT    2. Allergic rhinitis, unspecified seasonality, unspecified trigger    3. Dysfunction of both eustachian tubes        OTC nasal steroids qd.    F/U prn.         No follow-ups on file.

## 2024-07-17 NOTE — PROGRESS NOTES
"Dg Le was seen today in the clinic for an audiologic evaluation.  Patient's main complaint was intermittent "ear popping" and aural fullness, bilaterally. Mr. Le reported his symptoms have been present for the past year. He reported his hearing is occasionally muffled, bilaterally. Mr. Le denied tinnitus, otalgia, true vertigo, and history of noise exposure.    Tympanometry revealed Type A tympanograms, bilaterally.    Audiogram results revealed normal hearing sensitivity, bilaterally.    Speech reception thresholds were noted at 10 dB in the right ear and 10 dB in the left ear.    Speech discrimination scores were 100% in the right ear and 100% in the left ear.    Recommendations:  Otologic evaluation  Repeat audiogram as needed or sooner if change perceived  Hearing protection in noise          "

## 2024-07-31 ENCOUNTER — PATIENT MESSAGE (OUTPATIENT)
Dept: PLASTIC SURGERY | Facility: CLINIC | Age: 19
End: 2024-07-31
Payer: COMMERCIAL

## 2024-07-31 DIAGNOSIS — N62 GYNECOMASTIA: Primary | ICD-10-CM

## 2024-08-01 NOTE — PROGRESS NOTES
Subjective:       Patient ID: Dg Le is a 19 y.o. male.    Chief Complaint: f/u right testicular pain     This is a 19 y.o.  male patient that is new to me but not new to the system.  This is a patient of Dr. Polanco who was seen two months ago for right testicular pain that had been ongoing for a couple of months.   Patient was given ibuprofen 800mg as well as a referral for PFPT.   US of scortum 5/30/24: showed no abnormality.  CT abd and pelvis 5/30/24: No evidence of nephrolithiasis or obstructive uropathy, mild bladder wall thickening.  Today patient reports right testicular pain is still present. Reports that he always has a constant ache to the right testicle and every couple of days he has a flare where the pain is sharp in his right testicle. Taking ibuprofen does help by relieving the pain. Reports that he does not feel like he goes to the bathroom often enough during the day. He sometimes will go 6 hours without voiding even though he drinks plenty of water. Reports that he sometimes has to wait a couple of seconds to get his stream started but he does not strain. Reports some dysuria. Reports that sometimes after ejaculation he has a stinging pain that goes away after an hour.   Denies frequency and urgency of urination, hematuria, fevers, chills.  PVR 0ml immediately after urinating in clinic        Lab Results   Component Value Date    CREATININE 1.0 06/27/2024       ---  PMH/PSH/Medications/Allergies/Social history reviewed and as in chart.    Review of Systems   Constitutional:  Negative for activity change, chills and fever.   Respiratory:  Negative for shortness of breath.    Cardiovascular:  Negative for chest pain and palpitations.   Gastrointestinal:  Negative for abdominal pain and constipation.   Genitourinary:  Positive for testicular pain. Negative for difficulty urinating, dysuria, flank pain, frequency, hematuria, penile discharge, penile pain, penile swelling, scrotal  swelling and urgency.   Neurological:  Negative for dizziness and light-headedness.       Objective:      Physical Exam  HENT:      Head: Normocephalic.   Pulmonary:      Effort: Pulmonary effort is normal.   Musculoskeletal:         General: Normal range of motion.      Cervical back: Normal range of motion.   Skin:     General: Skin is warm and dry.   Neurological:      Mental Status: He is alert and oriented to person, place, and time.         Assessment:     Problem Noted   Right Testicular Pain 8/2/2024    Conservative measures-- NSAIDS, scrotal support, warm compresses, PFPT     Dysuria 8/2/2024       Plan:     Dicussed conservative measures for testicular pain: Can take over the counter Ibuprofen 200-400mg three times daily as needed for pain, supportive underwear, warm compresses, and stretching/exercises at home for pelvic floor muscles.    Void every 3 hours during the day, do not hold in the urge to void or wait for the urge to void if it has been over 3 hours.   PFPT was discussed, patient unable to do this right now  Urine culture sent, will call with results  Dicussed future cystoscopy if urination symptoms still ongoing at follow-up  Follow-up 3 months    TRACEY Cardenas    I spent a total of 25 minutes on the day of the visit.This includes face to face time and non-face to face time preparing to see the patient (eg, review of tests), obtaining and/or reviewing separately obtained history, documenting clinical information in the electronic or other health record, independently interpreting results and communicating results to the patient/family/caregiver, or care coordinator.

## 2024-08-02 ENCOUNTER — OFFICE VISIT (OUTPATIENT)
Dept: UROLOGY | Facility: CLINIC | Age: 19
End: 2024-08-02
Payer: COMMERCIAL

## 2024-08-02 VITALS
HEART RATE: 103 BPM | SYSTOLIC BLOOD PRESSURE: 135 MMHG | DIASTOLIC BLOOD PRESSURE: 84 MMHG | WEIGHT: 169.19 LBS | BODY MASS INDEX: 22.42 KG/M2 | HEIGHT: 73 IN

## 2024-08-02 DIAGNOSIS — R30.0 DYSURIA: ICD-10-CM

## 2024-08-02 DIAGNOSIS — N50.811 RIGHT TESTICULAR PAIN: Primary | ICD-10-CM

## 2024-08-02 LAB — POC RESIDUAL URINE VOLUME: 0 ML (ref 0–100)

## 2024-08-02 PROCEDURE — 99999 PR PBB SHADOW E&M-EST. PATIENT-LVL III: CPT | Mod: PBBFAC,,,

## 2024-08-02 PROCEDURE — 87086 URINE CULTURE/COLONY COUNT: CPT

## 2024-08-02 NOTE — PATIENT INSTRUCTIONS
Can take over the counter Ibuprofen 200-400mg three times daily as needed for pain, supportive underwear, warm compresses, and stretching/exercises at home for pelvic floor muscles.    Void every 3 hours during the day, do not hold in the urge to void or wait for the urge to void if it has been over 3 hours.

## 2024-08-05 ENCOUNTER — PATIENT MESSAGE (OUTPATIENT)
Dept: UROLOGY | Facility: CLINIC | Age: 19
End: 2024-08-05
Payer: COMMERCIAL

## 2024-08-09 ENCOUNTER — LAB VISIT (OUTPATIENT)
Dept: LAB | Facility: HOSPITAL | Age: 19
End: 2024-08-09
Payer: COMMERCIAL

## 2024-08-09 ENCOUNTER — OFFICE VISIT (OUTPATIENT)
Dept: PRIMARY CARE CLINIC | Facility: CLINIC | Age: 19
End: 2024-08-09
Payer: COMMERCIAL

## 2024-08-09 VITALS
OXYGEN SATURATION: 99 % | HEIGHT: 73 IN | BODY MASS INDEX: 23.37 KG/M2 | WEIGHT: 176.38 LBS | HEART RATE: 69 BPM | DIASTOLIC BLOOD PRESSURE: 82 MMHG | SYSTOLIC BLOOD PRESSURE: 132 MMHG

## 2024-08-09 DIAGNOSIS — Z01.818 PREOP EXAMINATION: Primary | ICD-10-CM

## 2024-08-09 DIAGNOSIS — N62 GYNECOMASTIA: ICD-10-CM

## 2024-08-09 DIAGNOSIS — Z01.818 PREOP EXAMINATION: ICD-10-CM

## 2024-08-09 LAB
ANION GAP SERPL CALC-SCNC: 10 MMOL/L (ref 8–16)
BASOPHILS # BLD AUTO: 0.04 K/UL (ref 0–0.2)
BASOPHILS NFR BLD: 0.8 % (ref 0–1.9)
BUN SERPL-MCNC: 7 MG/DL (ref 6–20)
CALCIUM SERPL-MCNC: 9.6 MG/DL (ref 8.7–10.5)
CHLORIDE SERPL-SCNC: 108 MMOL/L (ref 95–110)
CO2 SERPL-SCNC: 23 MMOL/L (ref 23–29)
CREAT SERPL-MCNC: 0.9 MG/DL (ref 0.5–1.4)
DIFFERENTIAL METHOD BLD: ABNORMAL
EOSINOPHIL # BLD AUTO: 0.1 K/UL (ref 0–0.5)
EOSINOPHIL NFR BLD: 2.1 % (ref 0–8)
ERYTHROCYTE [DISTWIDTH] IN BLOOD BY AUTOMATED COUNT: 13.1 % (ref 11.5–14.5)
EST. GFR  (NO RACE VARIABLE): >60 ML/MIN/1.73 M^2
GLUCOSE SERPL-MCNC: 85 MG/DL (ref 70–110)
HCT VFR BLD AUTO: 44.1 % (ref 40–54)
HGB BLD-MCNC: 14.6 G/DL (ref 14–18)
IMM GRANULOCYTES # BLD AUTO: 0.01 K/UL (ref 0–0.04)
IMM GRANULOCYTES NFR BLD AUTO: 0.2 % (ref 0–0.5)
LYMPHOCYTES # BLD AUTO: 1.6 K/UL (ref 1–4.8)
LYMPHOCYTES NFR BLD: 33.4 % (ref 18–48)
MCH RBC QN AUTO: 28.3 PG (ref 27–31)
MCHC RBC AUTO-ENTMCNC: 33.1 G/DL (ref 32–36)
MCV RBC AUTO: 86 FL (ref 82–98)
MONOCYTES # BLD AUTO: 0.4 K/UL (ref 0.3–1)
MONOCYTES NFR BLD: 7.8 % (ref 4–15)
NEUTROPHILS # BLD AUTO: 2.6 K/UL (ref 1.8–7.7)
NEUTROPHILS NFR BLD: 55.7 % (ref 38–73)
NRBC BLD-RTO: 0 /100 WBC
OHS QRS DURATION: 74 MS
OHS QTC CALCULATION: 403 MS
PLATELET # BLD AUTO: 176 K/UL (ref 150–450)
PMV BLD AUTO: 13.8 FL (ref 9.2–12.9)
POTASSIUM SERPL-SCNC: 4.1 MMOL/L (ref 3.5–5.1)
RBC # BLD AUTO: 5.16 M/UL (ref 4.6–6.2)
SODIUM SERPL-SCNC: 141 MMOL/L (ref 136–145)
WBC # BLD AUTO: 4.73 K/UL (ref 3.9–12.7)

## 2024-08-09 PROCEDURE — 99999 PR PBB SHADOW E&M-EST. PATIENT-LVL III: CPT | Mod: PBBFAC,,, | Performed by: NURSE PRACTITIONER

## 2024-08-09 PROCEDURE — 85025 COMPLETE CBC W/AUTO DIFF WBC: CPT | Performed by: NURSE PRACTITIONER

## 2024-08-09 PROCEDURE — 36415 COLL VENOUS BLD VENIPUNCTURE: CPT | Performed by: NURSE PRACTITIONER

## 2024-08-09 PROCEDURE — 80048 BASIC METABOLIC PNL TOTAL CA: CPT | Performed by: NURSE PRACTITIONER

## 2024-08-28 ENCOUNTER — ANESTHESIA EVENT (OUTPATIENT)
Dept: SURGERY | Facility: HOSPITAL | Age: 19
End: 2024-08-28

## 2024-08-28 ENCOUNTER — TELEPHONE (OUTPATIENT)
Dept: PLASTIC SURGERY | Facility: CLINIC | Age: 19
End: 2024-08-28
Payer: COMMERCIAL

## 2024-08-28 NOTE — TELEPHONE ENCOUNTER
Contacted pt with arrival time of 10:00am to [facility] 2nd floor. Reminder not to eat or drink after midnight and to shower/bathe tonight. Pt advised to wear loose, comfortable clothing.

## 2024-08-29 ENCOUNTER — HOSPITAL ENCOUNTER (OUTPATIENT)
Facility: HOSPITAL | Age: 19
Discharge: HOME | End: 2024-08-29
Attending: SURGERY | Admitting: SURGERY

## 2024-08-29 ENCOUNTER — ANESTHESIA (OUTPATIENT)
Dept: SURGERY | Facility: HOSPITAL | Age: 19
End: 2024-08-29

## 2024-08-29 VITALS
RESPIRATION RATE: 20 BRPM | HEART RATE: 73 BPM | DIASTOLIC BLOOD PRESSURE: 88 MMHG | TEMPERATURE: 98 F | OXYGEN SATURATION: 98 % | SYSTOLIC BLOOD PRESSURE: 135 MMHG

## 2024-08-29 DIAGNOSIS — N62 GYNECOMASTIA: ICD-10-CM

## 2024-08-29 PROCEDURE — 71000045 HC DOSC ROUTINE RECOVERY EA ADD'L HR: Performed by: SURGERY

## 2024-08-29 PROCEDURE — 71000044 HC DOSC ROUTINE RECOVERY FIRST HOUR: Performed by: SURGERY

## 2024-08-29 PROCEDURE — 63600175 PHARM REV CODE 636 W HCPCS

## 2024-08-29 PROCEDURE — 71000016 HC POSTOP RECOV ADDL HR: Performed by: SURGERY

## 2024-08-29 PROCEDURE — C1729 CATH, DRAINAGE: HCPCS | Performed by: SURGERY

## 2024-08-29 PROCEDURE — 25000003 PHARM REV CODE 250

## 2024-08-29 PROCEDURE — 19300 MASTECTOMY FOR GYNECOMASTIA: CPT | Mod: 50,CSM,, | Performed by: SURGERY

## 2024-08-29 PROCEDURE — 63600175 PHARM REV CODE 636 W HCPCS: Performed by: ANESTHESIOLOGY

## 2024-08-29 PROCEDURE — 36000707: Performed by: SURGERY

## 2024-08-29 PROCEDURE — 36000706: Performed by: SURGERY

## 2024-08-29 PROCEDURE — 63600175 PHARM REV CODE 636 W HCPCS: Performed by: STUDENT IN AN ORGANIZED HEALTH CARE EDUCATION/TRAINING PROGRAM

## 2024-08-29 PROCEDURE — 25000003 PHARM REV CODE 250: Performed by: STUDENT IN AN ORGANIZED HEALTH CARE EDUCATION/TRAINING PROGRAM

## 2024-08-29 PROCEDURE — 37000009 HC ANESTHESIA EA ADD 15 MINS: Performed by: SURGERY

## 2024-08-29 PROCEDURE — 63600175 PHARM REV CODE 636 W HCPCS: Mod: JZ,JG

## 2024-08-29 PROCEDURE — 27201423 OPTIME MED/SURG SUP & DEVICES STERILE SUPPLY: Performed by: SURGERY

## 2024-08-29 PROCEDURE — 71000015 HC POSTOP RECOV 1ST HR: Performed by: SURGERY

## 2024-08-29 PROCEDURE — 25000003 PHARM REV CODE 250: Performed by: SURGERY

## 2024-08-29 PROCEDURE — 37000008 HC ANESTHESIA 1ST 15 MINUTES: Performed by: SURGERY

## 2024-08-29 RX ORDER — ACETAMINOPHEN 10 MG/ML
INJECTION, SOLUTION INTRAVENOUS
Status: DISCONTINUED | OUTPATIENT
Start: 2024-08-29 | End: 2024-08-29

## 2024-08-29 RX ORDER — LIDOCAINE HYDROCHLORIDE 20 MG/ML
INJECTION, SOLUTION EPIDURAL; INFILTRATION; INTRACAUDAL; PERINEURAL
Status: DISCONTINUED | OUTPATIENT
Start: 2024-08-29 | End: 2024-08-29

## 2024-08-29 RX ORDER — DEXMEDETOMIDINE HYDROCHLORIDE 100 UG/ML
INJECTION, SOLUTION INTRAVENOUS
Status: DISCONTINUED | OUTPATIENT
Start: 2024-08-29 | End: 2024-08-29

## 2024-08-29 RX ORDER — SODIUM CHLORIDE 0.9 % (FLUSH) 0.9 %
10 SYRINGE (ML) INJECTION
Status: DISCONTINUED | OUTPATIENT
Start: 2024-08-29 | End: 2024-08-29 | Stop reason: HOSPADM

## 2024-08-29 RX ORDER — ROCURONIUM BROMIDE 10 MG/ML
INJECTION, SOLUTION INTRAVENOUS
Status: DISCONTINUED | OUTPATIENT
Start: 2024-08-29 | End: 2024-08-29

## 2024-08-29 RX ORDER — HYDROMORPHONE HYDROCHLORIDE 1 MG/ML
INJECTION, SOLUTION INTRAMUSCULAR; INTRAVENOUS; SUBCUTANEOUS
Status: COMPLETED
Start: 2024-08-29 | End: 2024-08-29

## 2024-08-29 RX ORDER — FENTANYL CITRATE 50 UG/ML
25 INJECTION, SOLUTION INTRAMUSCULAR; INTRAVENOUS EVERY 5 MIN PRN
Status: COMPLETED | OUTPATIENT
Start: 2024-08-29 | End: 2024-08-29

## 2024-08-29 RX ORDER — KETAMINE HCL IN 0.9 % NACL 50 MG/5 ML
SYRINGE (ML) INTRAVENOUS
Status: DISCONTINUED | OUTPATIENT
Start: 2024-08-29 | End: 2024-08-29

## 2024-08-29 RX ORDER — GLUCAGON 1 MG
1 KIT INJECTION
Status: DISCONTINUED | OUTPATIENT
Start: 2024-08-29 | End: 2024-08-29 | Stop reason: HOSPADM

## 2024-08-29 RX ORDER — HYDROMORPHONE HYDROCHLORIDE 1 MG/ML
0.2 INJECTION, SOLUTION INTRAMUSCULAR; INTRAVENOUS; SUBCUTANEOUS EVERY 5 MIN PRN
Status: DISCONTINUED | OUTPATIENT
Start: 2024-08-29 | End: 2024-08-29 | Stop reason: HOSPADM

## 2024-08-29 RX ORDER — MUPIROCIN 20 MG/G
OINTMENT TOPICAL
Status: DISCONTINUED | OUTPATIENT
Start: 2024-08-29 | End: 2024-08-29 | Stop reason: HOSPADM

## 2024-08-29 RX ORDER — OXYCODONE AND ACETAMINOPHEN 5; 325 MG/1; MG/1
1 TABLET ORAL EVERY 6 HOURS PRN
Qty: 28 TABLET | Refills: 0 | Status: SHIPPED | OUTPATIENT
Start: 2024-08-29

## 2024-08-29 RX ORDER — PROCHLORPERAZINE EDISYLATE 5 MG/ML
5 INJECTION INTRAMUSCULAR; INTRAVENOUS EVERY 30 MIN PRN
Status: DISCONTINUED | OUTPATIENT
Start: 2024-08-29 | End: 2024-08-29 | Stop reason: HOSPADM

## 2024-08-29 RX ORDER — FENTANYL CITRATE 50 UG/ML
INJECTION, SOLUTION INTRAMUSCULAR; INTRAVENOUS
Status: DISCONTINUED | OUTPATIENT
Start: 2024-08-29 | End: 2024-08-29

## 2024-08-29 RX ORDER — ONDANSETRON HYDROCHLORIDE 2 MG/ML
INJECTION, SOLUTION INTRAVENOUS
Status: DISCONTINUED | OUTPATIENT
Start: 2024-08-29 | End: 2024-08-29

## 2024-08-29 RX ORDER — MIDAZOLAM HYDROCHLORIDE 5 MG/ML
INJECTION INTRAMUSCULAR; INTRAVENOUS
Status: DISCONTINUED | OUTPATIENT
Start: 2024-08-29 | End: 2024-08-29

## 2024-08-29 RX ORDER — DEXAMETHASONE SODIUM PHOSPHATE 4 MG/ML
INJECTION, SOLUTION INTRA-ARTICULAR; INTRALESIONAL; INTRAMUSCULAR; INTRAVENOUS; SOFT TISSUE
Status: DISCONTINUED | OUTPATIENT
Start: 2024-08-29 | End: 2024-08-29

## 2024-08-29 RX ORDER — PROPOFOL 10 MG/ML
VIAL (ML) INTRAVENOUS
Status: DISCONTINUED | OUTPATIENT
Start: 2024-08-29 | End: 2024-08-29

## 2024-08-29 RX ORDER — OXYCODONE HYDROCHLORIDE 5 MG/1
5 TABLET ORAL
Status: DISCONTINUED | OUTPATIENT
Start: 2024-08-29 | End: 2024-08-29 | Stop reason: HOSPADM

## 2024-08-29 RX ORDER — LIDOCAINE HYDROCHLORIDE AND EPINEPHRINE 5; 5 MG/ML; UG/ML
INJECTION, SOLUTION INFILTRATION; PERINEURAL
Status: DISCONTINUED | OUTPATIENT
Start: 2024-08-29 | End: 2024-08-29 | Stop reason: HOSPADM

## 2024-08-29 RX ORDER — LIDOCAINE HYDROCHLORIDE 10 MG/ML
1 INJECTION, SOLUTION EPIDURAL; INFILTRATION; INTRACAUDAL; PERINEURAL ONCE
Status: DISCONTINUED | OUTPATIENT
Start: 2024-08-29 | End: 2024-08-29 | Stop reason: HOSPADM

## 2024-08-29 RX ADMIN — DEXMEDETOMIDINE 8 MCG: 200 INJECTION, SOLUTION INTRAVENOUS at 01:08

## 2024-08-29 RX ADMIN — FENTANYL CITRATE 25 MCG: 50 INJECTION INTRAMUSCULAR; INTRAVENOUS at 04:08

## 2024-08-29 RX ADMIN — MIDAZOLAM 2 MG: 5 INJECTION INTRAMUSCULAR; INTRAVENOUS at 01:08

## 2024-08-29 RX ADMIN — PROPOFOL 400 MG: 10 INJECTION, EMULSION INTRAVENOUS at 01:08

## 2024-08-29 RX ADMIN — LIDOCAINE HYDROCHLORIDE 100 MG: 20 INJECTION, SOLUTION EPIDURAL; INFILTRATION; INTRACAUDAL at 01:08

## 2024-08-29 RX ADMIN — HYDROMORPHONE HYDROCHLORIDE 0.2 MG: 1 INJECTION, SOLUTION INTRAMUSCULAR; INTRAVENOUS; SUBCUTANEOUS at 05:08

## 2024-08-29 RX ADMIN — ROCURONIUM BROMIDE 15 MG: 10 INJECTION INTRAVENOUS at 02:08

## 2024-08-29 RX ADMIN — MUPIROCIN: 20 OINTMENT TOPICAL at 10:08

## 2024-08-29 RX ADMIN — CEFAZOLIN 2 G: 2 INJECTION, POWDER, FOR SOLUTION INTRAMUSCULAR; INTRAVENOUS at 01:08

## 2024-08-29 RX ADMIN — Medication 50 MG: at 01:08

## 2024-08-29 RX ADMIN — ROCURONIUM BROMIDE 50 MG: 10 INJECTION INTRAVENOUS at 01:08

## 2024-08-29 RX ADMIN — OXYCODONE HYDROCHLORIDE 5 MG: 5 TABLET ORAL at 05:08

## 2024-08-29 RX ADMIN — ONDANSETRON 4 MG: 2 INJECTION INTRAMUSCULAR; INTRAVENOUS at 03:08

## 2024-08-29 RX ADMIN — SUGAMMADEX 200 MG: 100 INJECTION, SOLUTION INTRAVENOUS at 03:08

## 2024-08-29 RX ADMIN — SODIUM CHLORIDE, SODIUM GLUCONATE, SODIUM ACETATE, POTASSIUM CHLORIDE, MAGNESIUM CHLORIDE, SODIUM PHOSPHATE, DIBASIC, AND POTASSIUM PHOSPHATE: .53; .5; .37; .037; .03; .012; .00082 INJECTION, SOLUTION INTRAVENOUS at 01:08

## 2024-08-29 RX ADMIN — SODIUM CHLORIDE: 9 INJECTION, SOLUTION INTRAVENOUS at 01:08

## 2024-08-29 RX ADMIN — ACETAMINOPHEN 1000 MG: 10 INJECTION INTRAVENOUS at 02:08

## 2024-08-29 RX ADMIN — DEXAMETHASONE SODIUM PHOSPHATE 4 MG: 4 INJECTION INTRA-ARTICULAR; INTRALESIONAL; INTRAMUSCULAR; INTRAVENOUS; SOFT TISSUE at 01:08

## 2024-08-29 RX ADMIN — FENTANYL CITRATE 100 MCG: 50 INJECTION, SOLUTION INTRAMUSCULAR; INTRAVENOUS at 01:08

## 2024-08-29 RX ADMIN — PROCHLORPERAZINE EDISYLATE 5 MG: 5 INJECTION INTRAMUSCULAR; INTRAVENOUS at 06:08

## 2024-08-29 NOTE — OP NOTE
Date of surgery 08/29/2024   Preoperative diagnosis bilateral gynecomastia   Postoperative diagnosis is same   Procedure performed bilateral subcutaneous mastectomy   Surgeon Niall   Anesthesia general  Complications none   Blood loss minimal   Drains x2       The patient was evaluated in the preoperative holding area markings for the procedure were then made.  We discussed an infra-areolar incision.  Also discussed that it was more than likely that we would be reducing nipple-areolar complex in a second-stage although I would consider a during this stage.  Patient was taken to the operative room placed in supine position after adequate general anesthesia was prepped and draped in a normal sterile fashion.  Infra-areolar incision was then made.  It was deepened down to the subcutaneous tissue.  Using a 15. Blade the breast tissue was shaved from the back of the nipple.  Next, using both a pickle fork as well as a face of scissors the entire anterior surface of the breasts was  from the overlying subcutaneous tissue.  Next, the breast parenchyma itself was divided in half at the level of the incision.  It was deepened down to the pectoral fascia.  A suprafascial dissection then proceeded the entire gland was then removed.  Hemostasis was achieved using the Bovie under lighted retraction.  Also multiple clips were also used.  The wound was irrigated with copious amounts of saline irrigation.  Similar procedure was performed on the opposite side.  Photos were taken of both the right and left breast tissue.  The incisions were closed over drains using interrupted 3-0 Monocryl followed by running 4-0 Monocryl subcuticular suture.

## 2024-08-29 NOTE — TRANSFER OF CARE
Anesthesia Transfer of Care Note    Patient: Dg Le    Procedure(s) Performed: Procedure(s) (LRB):  MASTECTOMY, FOR GYNECOMASTIA (Bilateral)    Patient location: PACU    Anesthesia Type: general    Transport from OR: Transported from OR on 6-10 L/min O2 by face mask with adequate spontaneous ventilation    Post pain: adequate analgesia    Post assessment: no apparent anesthetic complications and tolerated procedure well    Post vital signs: stable    Level of consciousness: awake and alert    Nausea/Vomiting: no nausea/vomiting    Complications: none    Transfer of care protocol was followed      Last vitals: Visit Vitals  BP (!) 139/94   Pulse 107   Temp 36.7 °C (98.1 °F) (Temporal)   Resp 18   SpO2 100%

## 2024-08-29 NOTE — BRIEF OP NOTE
.Brief Operative Note     SUMMARY     Surgery Date: 8/29/2024     Surgeons and Role:     * Darien Bolton MD - Primary     * Gilberto Lopez MD - Resident - Assisting        Pre-op Diagnosis:  Gynecomastia [N62]    Post-op Diagnosis:  Gynecomastia [N62]    Procedure(s) (LRB):  MASTECTOMY, FOR GYNECOMASTIA (Bilateral)    Anesthesia: General    Description of Procedure:   B/l mastectomy    Findings/Key Components:  Periareolar mastectomy    Estimated Blood Loss: less than 50 mL         Specimens Removed:   Specimen (24h ago, onward)      None            Discharge Note      SUMMARY     Admit Date: 8/29/2024    Attending Physician: Darien Bolton,*     Discharge Physician: Darien Bolton,*    Discharge Date: 8/29/2024     Final Diagnosis: Gynecomastia [N62]    Hospital Course: Patient was admitted for an outpatient procedure and tolerated the procedure well with no complications.    Disposition: Home or Self Care    Follow Up/Patient Instructions:   Current Discharge Medication List        START taking these medications    Details   oxyCODONE-acetaminophen (PERCOCET) 5-325 mg per tablet Take 1 tablet by mouth every 6 (six) hours as needed for Pain.  Qty: 28 tablet, Refills: 0    Comments: Quantity prescribed more than 7 day supply? No           CONTINUE these medications which have NOT CHANGED    Details   ibuprofen (ADVIL,MOTRIN) 800 MG tablet TAKE 1 TABLET BY MOUTH 3 TIMES DAILY AS NEEDED FOR PAIN.  Qty: 90 tablet, Refills: 0             Discharge Procedure Orders (must include Diet, Follow-up, Activity)   Discharge Procedure Orders (must include Diet, Follow-up, Activity)   Lifting restrictions     Teach KELLY drain care and provide sheet to record output     Wear Surgical Bra and/or Girdle at all times (may remove for short times to shower)   Order Comments: Wear Surgical Bra and/or Girdle at all times (may remove for short times to shower)     Discharge instructions (Specify)   Order  Comments: .Keep KELLY drains in place and monitor output, record in log for office visit   Apply bacitracin to nipples three times a day and cover  Wear surgical vest at all times  Okay to shower in 48hrs, avoid submerging the incisions in bath tub/pool  Use of narcotics can lead to constipation, recommend taking a stool softener while on medications  Take medications as prescribed  F/u in clinic in 1 week  Call the office at [phone] with any questions or concerns     Call MD for:  temperature >100.4     Call MD for:  persistent nausea and vomiting     Call MD for:  severe uncontrolled pain     Call MD for:  difficulty breathing, headache or visual disturbances     Call MD for:  redness, tenderness, or signs of infection (pain, swelling, redness, odor or green/yellow discharge around incision site)     Call MD for:  hives     Call MD for:  persistent dizziness or light-headedness     Call MD for:  extreme fatigue     Activity as tolerated     Shower on day dressing removed (No bath)

## 2024-08-29 NOTE — ANESTHESIA PROCEDURE NOTES
Intubation    Date/Time: 8/29/2024 1:34 PM    Performed by: Denice Nash MD  Authorized by: Leopold, Rhonda G, MD    Intubation:     Induction:  Intravenous    Intubated:  Postinduction    Mask Ventilation:  Easy with oral airway    Attempts:  1    Attempted By:  Resident anesthesiologist    Method of Intubation:  Video laryngoscopy    Blade:  Kee 3    Laryngeal View Grade: Grade I - full view of cords      Difficult Airway Encountered?: No      Complications:  None    Airway Device:  Oral endotracheal tube    Airway Device Size:  7.5    Style/Cuff Inflation:  Cuffed (inflated to minimal occlusive pressure)    Tube secured:  25    Secured at:  The lips    Placement Verified By:  Capnometry    Complicating Factors:  Anterior larynx    Findings Post-Intubation:  Atraumatic/condition of teeth unchanged and BS equal bilateral

## 2024-08-29 NOTE — H&P
".Plastic and Reconstructive Surgery   H&P    Date:   08/29/2024    History of Present Illness:    19 y.o. male who presents for gynecomastia b/l.  No recent illness/sickness/hospitalizations    There is no change in H&P since last office visit. Will proceed with planned procedure.      Past Medical History:    has no past medical history on file.    Past Surgical History:    has no past surgical history on file.    Social History:  Social History     Tobacco Use    Smoking status: Former     Types: Vaping with nicotine    Smokeless tobacco: Never   Substance Use Topics    Alcohol use: Not Currently     Social History     Substance and Sexual Activity   Drug Use Yes    Frequency: 7.0 times per week    Types: Marijuana       Family History:  Family History   Problem Relation Name Age of Onset    Drug abuse Mother Sarahi     Alcohol abuse Father Ang     Drug abuse Father Ang        Allergies:  Review of patient's allergies indicates:  No Known Allergies    Home Medications:  Scheduled Meds:  Continuous Infusions:  PRN Meds:.      Review of Systems:  Negative except for what is noted in HPI    Physical Exam:  VITAL SIGNS:   There were no vitals filed for this visit.  TMAX: No data recorded.      General: Alert; No acute distress  Cardiovascular: Regular rate   Respiratory: Normal respiratory effort. Chest rise symmetric.   Abdomen: Soft, nontender, nondistended  Extremity: Moves all extremities equally.  Neurologic: No focal deficit. Speech normal  Bilateral breast tissue. Large nipples everted         Diagnostic Data:  No results found for this or any previous visit (from the past 336 hour(s)).  No results found for this or any previous visit (from the past 336 hour(s)).  Lab Results   Component Value Date    ALBUMIN 4.7 06/27/2024     No results found for: "CRP"  No results found for: "INR", "PROTIME"  No results found for: "PTT"    Microbiology Results (last 7 days)       ** No results found for the last 168 hours. " **            Assessment:  19 y.o.male b/l gynecomastia    Plan:  Plan for b/l mastectomy, possible areolar reduction in OR  Consent obtained    Discussed with patient and/or family the risks and benefits of surgical intervention.  Conservative measures have been exhausted and patient would like to proceed with surgery.      We have discussed risks, which include but are not limited to blood clots in the legs that can travel to the lungs (pulmonary embolism). Pulmonary embolism can cause shortness of breath, chest pain, and even shock. Other risks include urinary tract infection, nausea and vomiting (usually related to pain medication), chronic pain, bleeding, nerve damage, blood vessel injury, scarring and infection, which can require re-operation. Furthermore, the risks of anesthesia include potential heart, lung, kidney, and liver damage.  Informed consent was obtained.  The patient understands and would like to proceed with surgery.    Gilberto Lopez MD  Plastic and Reconstructive Surgery Fellow

## 2024-08-30 ENCOUNTER — PATIENT MESSAGE (OUTPATIENT)
Dept: PLASTIC SURGERY | Facility: CLINIC | Age: 19
End: 2024-08-30
Payer: COMMERCIAL

## 2024-09-03 NOTE — ANESTHESIA POSTPROCEDURE EVALUATION
Anesthesia Post Evaluation    Patient: Dg Le    Procedure(s) Performed: Procedure(s) (LRB):  MASTECTOMY, FOR GYNECOMASTIA (Bilateral)    Final Anesthesia Type: general      Patient location during evaluation: PACU  Patient participation: Yes- Able to Participate  Level of consciousness: awake  Post-procedure vital signs: reviewed and stable  Pain management: adequate  Airway patency: patent    PONV status at discharge: nausea (controlled)  Anesthetic complications: no      Cardiovascular status: blood pressure returned to baseline and stable  Respiratory status: spontaneous ventilation  Hydration status: euvolemic  Follow-up not needed.              Vitals Value Taken Time   /88 08/29/24 1816   Temp 36.7 °C (98.1 °F) 08/29/24 1815   Pulse 99 08/29/24 1820   Resp 20 08/29/24 1815   SpO2 100 % 08/29/24 1820   Vitals shown include unfiled device data.      No case tracking events are documented in the log.      Pain/Karin Score: No data recorded

## 2024-09-04 ENCOUNTER — OFFICE VISIT (OUTPATIENT)
Dept: PLASTIC SURGERY | Facility: CLINIC | Age: 19
End: 2024-09-04
Payer: COMMERCIAL

## 2024-09-04 VITALS
BODY MASS INDEX: 23.37 KG/M2 | WEIGHT: 176.38 LBS | OXYGEN SATURATION: 99 % | DIASTOLIC BLOOD PRESSURE: 83 MMHG | HEART RATE: 86 BPM | SYSTOLIC BLOOD PRESSURE: 129 MMHG | HEIGHT: 73 IN

## 2024-09-04 DIAGNOSIS — Z09 SURGERY FOLLOW-UP EXAMINATION: Primary | ICD-10-CM

## 2024-09-04 PROCEDURE — 99999 PR PBB SHADOW E&M-EST. PATIENT-LVL III: CPT | Mod: PBBFAC,,, | Performed by: SURGERY

## 2024-09-04 PROCEDURE — 3074F SYST BP LT 130 MM HG: CPT | Mod: CPTII,S$GLB,, | Performed by: SURGERY

## 2024-09-04 PROCEDURE — 99024 POSTOP FOLLOW-UP VISIT: CPT | Mod: S$GLB,,, | Performed by: SURGERY

## 2024-09-04 PROCEDURE — 1159F MED LIST DOCD IN RCRD: CPT | Mod: CPTII,S$GLB,, | Performed by: SURGERY

## 2024-09-04 PROCEDURE — 3079F DIAST BP 80-89 MM HG: CPT | Mod: CPTII,S$GLB,, | Performed by: SURGERY

## 2024-09-04 NOTE — PROGRESS NOTES
Plastic Surgery Clinic Postop Visit    Subjective:      Dg Le is a 19 y.o. year old male who presents to the Plastic Surgery Clinic on 09/04/2024 for follow up visit status post bilateral mastectomy  for gynecomastia on 8/29. Patient reports doing well following surgery. Pain is well controlled and he is not taking any pain medications. He reports the combined daily drain output has been <20 cc since surgery and it continues to decrease. Reports slightly more swelling on the L.    Denies fever, chills, nausea, vomiting, or other systemic signs of infection.   No reported issues or complaints    Vitals:    09/04/24 0934   BP: 129/83   Pulse: 86        Review of patient's allergies indicates:  No Known Allergies    Current Outpatient Medications on File Prior to Visit   Medication Sig Dispense Refill    ibuprofen (ADVIL,MOTRIN) 800 MG tablet TAKE 1 TABLET BY MOUTH 3 TIMES DAILY AS NEEDED FOR PAIN. 90 tablet 0    oxyCODONE-acetaminophen (PERCOCET) 5-325 mg per tablet Take 1 tablet by mouth every 6 (six) hours as needed for Pain. 28 tablet 0     No current facility-administered medications on file prior to visit.       Patient Active Problem List   Diagnosis    Right testicular pain    Dysuria       Past Surgical History:   Procedure Laterality Date    MASTECTOMY FOR GYNECOMASTIA Bilateral 8/29/2024    Procedure: MASTECTOMY, FOR GYNECOMASTIA;  Surgeon: Darien Bolton MD;  Location: [facility] OR [address] [place] [address];  Service: Plastics;  Laterality: Bilateral;  COSMETIC       Social History     Socioeconomic History    Marital status: Single   Tobacco Use    Smoking status: Former     Types: Vaping with nicotine    Smokeless tobacco: Never   Substance and Sexual Activity    Alcohol use: Not Currently    Drug use: Yes     Frequency: 7.0 times per week     Types: Marijuana    Sexual activity: Not Currently     Partners: Female     Birth control/protection: Condom     Social Determinants of Health     Financial  Resource Strain: Low Risk  (6/7/2024)    Overall Financial Resource Strain (CARDIA)     Difficulty of Paying Living Expenses: Not hard at all   Food Insecurity: No Food Insecurity (6/7/2024)    Hunger Vital Sign     Worried About Running Out of Food in the Last Year: Never true     Ran Out of Food in the Last Year: Never true   Physical Activity: Insufficiently Active (6/7/2024)    Exercise Vital Sign     Days of Exercise per Week: 3 days     Minutes of Exercise per Session: 10 min   Stress: Stress Concern Present (6/7/2024)    [employer] [place] [employer] - Occupational Stress Questionnaire     Feeling of Stress : To some extent   Housing Stability: Unknown (6/7/2024)    Housing Stability Vital Sign     Unable to Pay for Housing in the Last Year: No       Review of Systems:   Negative unless otherwise stated above in HPI    Objective:     Physical Exam:  Vitals:    09/04/24 0934   BP: 129/83   Pulse: 86       WD WN NAD  VSS  Normal resp effort, NLB  Incisions c/d/I without dehiscence  Chest: old ecchymosis over chest wall bilaterally. No erythema or palpable fluid collection. KELLY drains in place with minimal serosanguinous output.         Assessment:       No diagnosis found.    Plan:   19 y.o. male status post bilateral mastectomy for gynecomastia.   - Doing well, no issues  - Drains removed without issue  - Return to clinic in 2 weeks for suture removal. Staff to schedule.      All questions were answered. The patient was advised to call the clinic with any questions or concerns prior to their next visit.     Danielle Dexter MD   General Surgery PGY-1  9/4/2024

## 2024-09-18 ENCOUNTER — OFFICE VISIT (OUTPATIENT)
Dept: PLASTIC SURGERY | Facility: CLINIC | Age: 19
End: 2024-09-18
Payer: COMMERCIAL

## 2024-09-18 VITALS
SYSTOLIC BLOOD PRESSURE: 130 MMHG | DIASTOLIC BLOOD PRESSURE: 71 MMHG | WEIGHT: 176.38 LBS | BODY MASS INDEX: 23.37 KG/M2 | HEART RATE: 86 BPM | HEIGHT: 73 IN

## 2024-09-18 DIAGNOSIS — Z09 SURGERY FOLLOW-UP EXAMINATION: Primary | ICD-10-CM

## 2024-09-18 PROCEDURE — 99999 PR PBB SHADOW E&M-EST. PATIENT-LVL III: CPT | Mod: PBBFAC,,, | Performed by: SURGERY

## 2024-09-18 PROCEDURE — 3075F SYST BP GE 130 - 139MM HG: CPT | Mod: CPTII,S$GLB,, | Performed by: SURGERY

## 2024-09-18 PROCEDURE — 99024 POSTOP FOLLOW-UP VISIT: CPT | Mod: S$GLB,,, | Performed by: SURGERY

## 2024-09-18 PROCEDURE — 3078F DIAST BP <80 MM HG: CPT | Mod: CPTII,S$GLB,, | Performed by: SURGERY

## 2024-09-18 PROCEDURE — 1159F MED LIST DOCD IN RCRD: CPT | Mod: CPTII,S$GLB,, | Performed by: SURGERY

## 2024-11-20 ENCOUNTER — OFFICE VISIT (OUTPATIENT)
Dept: PLASTIC SURGERY | Facility: CLINIC | Age: 19
End: 2024-11-20
Payer: COMMERCIAL

## 2024-11-20 VITALS
BODY MASS INDEX: 23.37 KG/M2 | SYSTOLIC BLOOD PRESSURE: 147 MMHG | DIASTOLIC BLOOD PRESSURE: 86 MMHG | WEIGHT: 176.38 LBS | HEIGHT: 73 IN | HEART RATE: 98 BPM

## 2024-11-20 DIAGNOSIS — Z09 SURGERY FOLLOW-UP EXAMINATION: Primary | ICD-10-CM

## 2024-11-20 PROCEDURE — 99024 POSTOP FOLLOW-UP VISIT: CPT | Mod: S$GLB,,, | Performed by: SURGERY

## 2024-11-20 PROCEDURE — 99999 PR PBB SHADOW E&M-EST. PATIENT-LVL III: CPT | Mod: PBBFAC,,, | Performed by: SURGERY

## 2024-11-20 PROCEDURE — 3079F DIAST BP 80-89 MM HG: CPT | Mod: CPTII,S$GLB,, | Performed by: SURGERY

## 2024-11-20 PROCEDURE — 3077F SYST BP >= 140 MM HG: CPT | Mod: CPTII,S$GLB,, | Performed by: SURGERY

## 2024-11-20 NOTE — PROGRESS NOTES
.Plastic Surgery Clinic Postop Visit    Subjective:      Dg Le is a 19 y.o. year old male who presents to the Plastic Surgery Clinic on 11/20/2024 for follow up visit status post b/l periareolar mastectomies. No issues reported. Doing very well and happy with results..     Denies fever, chills, nausea, vomiting, or other systemic signs of infection.   No reported issues or complaints      Review of Systems:   Negative unless otherwise stated above in HPI    Objective:     Physical Exam:  Vitals:    11/20/24 0933   BP: (!) 147/86   Pulse: 98       WD WN NAD  VSS  Normal resp effort, NLB  Well healed periareolar incisions           Assessment:       No diagnosis found.    Plan:   19 y.o. male status post b/l mastectomies  - Doing well, no issues  - Return to clinic as needed      All questions were answered. The patient was advised to call the clinic with any questions or concerns prior to their next visit.       Dr. Primo Galeano, PGY-6  [facility]astic [facility] Fellow

## (undated) DEVICE — EVACUATOR WOUND BULB 100CC

## (undated) DEVICE — SPONGE LAP 18X18 PREWASHED

## (undated) DEVICE — TRAY MINOR GEN SURG OMC

## (undated) DEVICE — SUT ETHILON 4-0 PS2 18 BLK

## (undated) DEVICE — ELECTRODE BLADE INSULATED 1 IN

## (undated) DEVICE — BANDAGE ROLL COTTN 4.5INX4.1YD

## (undated) DEVICE — RETRACTOR RADIALUX LIGHTED

## (undated) DEVICE — ELECTRODE EXTENDED BLADE

## (undated) DEVICE — TOWEL OR DISP STRL BLUE 4/PK

## (undated) DEVICE — DRAPE HALF SURGICAL 40X58IN

## (undated) DEVICE — SUT MONOCRYL 3-0 PS-2 UND

## (undated) DEVICE — SUT ETHILON 5-0 18IN PLAIN

## (undated) DEVICE — SUT SILK 2-0 PS 18IN BLACK

## (undated) DEVICE — DRESSING XEROFORM NONADH 1X8IN

## (undated) DEVICE — SUT MCRYL PLUS 4-0 PS2 27IN

## (undated) DEVICE — SYR 30CC LUER LOCK

## (undated) DEVICE — MARKER FN REG DUAL UTIL RULER

## (undated) DEVICE — PAD ABDOMINAL STERILE 8X10IN

## (undated) DEVICE — BLADE SURG CARBON STEEL #10

## (undated) DEVICE — SUT PROLENE 5-0 PS-2 18

## (undated) DEVICE — Device

## (undated) DEVICE — BOVIE SUCTION

## (undated) DEVICE — GOWN AERO CHROME W/ TOWEL XL

## (undated) DEVICE — DRESSING TEGADERM CHG 3.5X4.5

## (undated) DEVICE — BLADE SURG #15 CARBON STEEL

## (undated) DEVICE — APPLIER CLIP LIAGCLIP 9.375IN

## (undated) DEVICE — DRAIN CHANNEL ROUND 15FR

## (undated) DEVICE — NDL SPINAL SPINOCAN 22GX3.5

## (undated) DEVICE — TIP YANKAUERS BULB NO VENT

## (undated) DEVICE — ELECTRODE REM PLYHSV RETURN 9

## (undated) DEVICE — GOWN SURGICAL X-LARGE

## (undated) DEVICE — SOL VASHE INSTILLATION WND 16

## (undated) DEVICE — NDL HYPO REG 25G X 1 1/2

## (undated) DEVICE — REMOVER STAPLE SKIN STERILE

## (undated) DEVICE — STAPLER SKIN ROTATING HEAD